# Patient Record
Sex: MALE | Race: WHITE | Employment: FULL TIME | URBAN - METROPOLITAN AREA
[De-identification: names, ages, dates, MRNs, and addresses within clinical notes are randomized per-mention and may not be internally consistent; named-entity substitution may affect disease eponyms.]

---

## 2018-03-27 PROBLEM — R97.20 ELEVATED PSA: Status: ACTIVE | Noted: 2018-03-27

## 2020-02-10 ENCOUNTER — HOSPITAL ENCOUNTER (OUTPATIENT)
Dept: LAB | Age: 60
Discharge: HOME | End: 2020-02-10
Payer: COMMERCIAL

## 2020-02-10 DIAGNOSIS — F17.200 SMOKER: ICD-10-CM

## 2020-02-10 DIAGNOSIS — C43.4 MALIGNANT MELANOMA OF NECK (HCC): ICD-10-CM

## 2020-02-10 LAB
ALBUMIN SERPL-MCNC: 3.8 G/DL (ref 3.2–4.6)
ALBUMIN/GLOB SERPL: 1 {RATIO} (ref 1.2–3.5)
ALP SERPL-CCNC: 158 U/L (ref 50–136)
ALT SERPL-CCNC: 28 U/L (ref 12–65)
ANION GAP SERPL CALC-SCNC: 7 MMOL/L (ref 7–16)
AST SERPL-CCNC: 14 U/L (ref 15–37)
BILIRUB SERPL-MCNC: 0.6 MG/DL (ref 0.2–1.1)
BUN SERPL-MCNC: 13 MG/DL (ref 8–23)
CALCIUM SERPL-MCNC: 8.9 MG/DL (ref 8.3–10.4)
CHLORIDE SERPL-SCNC: 99 MMOL/L (ref 98–107)
CO2 SERPL-SCNC: 30 MMOL/L (ref 21–32)
CREAT SERPL-MCNC: 1.08 MG/DL (ref 0.8–1.5)
ERYTHROCYTE [DISTWIDTH] IN BLOOD BY AUTOMATED COUNT: 11.7 % (ref 11.9–14.6)
GLOBULIN SER CALC-MCNC: 3.7 G/DL (ref 2.3–3.5)
GLUCOSE SERPL-MCNC: 509 MG/DL (ref 65–100)
HCT VFR BLD AUTO: 48.7 % (ref 41.1–50.3)
HGB BLD-MCNC: 16.7 G/DL (ref 13.6–17.2)
MCH RBC QN AUTO: 32.6 PG (ref 26.1–32.9)
MCHC RBC AUTO-ENTMCNC: 34.3 G/DL (ref 31.4–35)
MCV RBC AUTO: 94.9 FL (ref 79.6–97.8)
NRBC # BLD: 0 K/UL (ref 0–0.2)
PLATELET # BLD AUTO: 143 K/UL (ref 150–450)
PMV BLD AUTO: 10.8 FL (ref 9.4–12.3)
POTASSIUM SERPL-SCNC: 4.3 MMOL/L (ref 3.5–5.1)
PROT SERPL-MCNC: 7.5 G/DL (ref 6.3–8.2)
RBC # BLD AUTO: 5.13 M/UL (ref 4.23–5.6)
SODIUM SERPL-SCNC: 136 MMOL/L (ref 136–145)
WBC # BLD AUTO: 3.2 K/UL (ref 4.3–11.1)

## 2020-02-10 PROCEDURE — 80053 COMPREHEN METABOLIC PANEL: CPT

## 2020-02-10 PROCEDURE — 85027 COMPLETE CBC AUTOMATED: CPT

## 2020-02-10 PROCEDURE — 36415 COLL VENOUS BLD VENIPUNCTURE: CPT

## 2020-02-10 NOTE — PROGRESS NOTES
I called pt on 2/10/20 about his BG>500 and   urged him to take his diabetic meds and get in to see his primary care doctor asap!

## 2020-03-16 PROBLEM — E11.8 CONTROLLED TYPE 2 DIABETES MELLITUS WITH COMPLICATION, WITHOUT LONG-TERM CURRENT USE OF INSULIN (HCC): Status: ACTIVE | Noted: 2020-03-16

## 2020-03-16 PROBLEM — F10.10 EXCESSIVE DRINKING ALCOHOL: Status: ACTIVE | Noted: 2020-03-16

## 2020-03-16 PROBLEM — Z87.898 HISTORY OF ELEVATED PSA: Status: ACTIVE | Noted: 2020-03-16

## 2020-03-18 RX ORDER — SODIUM CHLORIDE 0.9 % (FLUSH) 0.9 %
5-40 SYRINGE (ML) INJECTION EVERY 8 HOURS
Status: CANCELLED | OUTPATIENT
Start: 2020-03-18

## 2020-03-18 RX ORDER — SODIUM CHLORIDE 0.9 % (FLUSH) 0.9 %
5-40 SYRINGE (ML) INJECTION AS NEEDED
Status: CANCELLED | OUTPATIENT
Start: 2020-03-18

## 2020-03-24 ENCOUNTER — HOSPITAL ENCOUNTER (OUTPATIENT)
Dept: SURGERY | Age: 60
Discharge: HOME | End: 2020-03-24
Payer: COMMERCIAL

## 2020-03-24 VITALS
SYSTOLIC BLOOD PRESSURE: 153 MMHG | TEMPERATURE: 98.8 F | OXYGEN SATURATION: 97 % | HEIGHT: 74 IN | RESPIRATION RATE: 18 BRPM | HEART RATE: 60 BPM | WEIGHT: 215.19 LBS | DIASTOLIC BLOOD PRESSURE: 77 MMHG | BODY MASS INDEX: 27.62 KG/M2

## 2020-03-24 LAB — GLUCOSE BLD STRIP.AUTO-MCNC: 320 MG/DL (ref 65–100)

## 2020-03-24 PROCEDURE — 82962 GLUCOSE BLOOD TEST: CPT

## 2020-03-24 NOTE — PERIOP NOTES
Patient verified name and     Order for consent was found in EHR and matches case posting; patient verified. Type 1B surgery, walk-in assessment complete. Labs per surgeon: none  Labs per anesthesia protocol: poc glucose  EKG: none required    Hospital approved surgical skin cleanser and instructions given per hospital policy. Patient provided with and instructed on educational handouts including Guide to Surgery, Pain Management, Hand Hygiene, Blood Transfusion Education, and [facility] Brochure. Patient answered medical/surgical history questions at their best of ability. All prior to admission medications documented in [facility]. Original medication prescription bottle not visualized during patient appointment. Patient instructed to hold all vitamins 7 days prior to surgery and NSAIDS 5 days prior to surgery, patient verbalized understanding. Patient teach back successful and patient demonstrates knowledge of instructions.

## 2020-03-24 NOTE — PERIOP NOTES
PLEASE CONTINUE TAKING ALL PRESCRIPTION MEDICATIONS UP TO THE DAY OF SURGERY UNLESS OTHERWISE DIRECTED BELOW. DISCONTINUE all vitamins and supplements 7 days prior to surgery. DISCONTINUE Non-Steriodal Anti-Inflammatory (NSAIDS) such as Advil and Aleve 5 days prior to surgery. Home Medications to take  the day of surgery    Acetaminophen 1000 mg           Home Medications   to Hold           Comments    On the day before surgery take Acetaminophen 1000mg in the morning and at bedtime       *Visitor policy of 1 visitor per patient discussed. Please do not bring home medications with you on the day of surgery unless otherwise directed by your nurse. If you are instructed to bring home medications, please give them to your nurse as they will be administered by the nursing staff. If you have any questions, please call [facility] [phone] or [facility] [phone]. A copy of this note was provided to the patient for reference.

## 2020-03-24 NOTE — PERIOP NOTES
POC glucose 320. Instructed  Patient that if blood sugar 300 or > , surgery may be cancelled. Dr. Ramiro James notified. Patient informed he needed to have blood sugar under control before surgery. Patient saw PCP last week for blood sugar greater than 500. He was to have lab work drawn and then follow up. I called Dr. Ryan Tijerina office. Patient will go in the am and have lab work drawn. Nurse will pull labs and have doctor review and start patient on medication.

## 2020-03-25 NOTE — PERIOP NOTES
Patient started on metformin and Farxiga by PCP. Updated patient by phone and  instructed to hold those medications am of surgery.

## 2020-03-30 ENCOUNTER — ANESTHESIA EVENT (OUTPATIENT)
Dept: SURGERY | Age: 60
End: 2020-03-30
Payer: COMMERCIAL

## 2020-03-31 ENCOUNTER — APPOINTMENT (OUTPATIENT)
Dept: NUCLEAR MEDICINE | Age: 60
End: 2020-03-31
Attending: SURGERY
Payer: COMMERCIAL

## 2020-03-31 ENCOUNTER — ANESTHESIA (OUTPATIENT)
Dept: SURGERY | Age: 60
End: 2020-03-31
Payer: COMMERCIAL

## 2020-03-31 ENCOUNTER — HOSPITAL ENCOUNTER (OUTPATIENT)
Age: 60
Setting detail: OUTPATIENT SURGERY
Discharge: HOME | End: 2020-03-31
Attending: SURGERY | Admitting: SURGERY
Payer: COMMERCIAL

## 2020-03-31 VITALS
HEART RATE: 53 BPM | SYSTOLIC BLOOD PRESSURE: 115 MMHG | WEIGHT: 209 LBS | TEMPERATURE: 98 F | RESPIRATION RATE: 14 BRPM | DIASTOLIC BLOOD PRESSURE: 59 MMHG | OXYGEN SATURATION: 95 % | BODY MASS INDEX: 26.83 KG/M2

## 2020-03-31 DIAGNOSIS — C43.4 MALIGNANT MELANOMA OF NECK (HCC): ICD-10-CM

## 2020-03-31 LAB — GLUCOSE BLD STRIP.AUTO-MCNC: 167 MG/DL (ref 65–100)

## 2020-03-31 PROCEDURE — 74011250636 HC RX REV CODE- 250/636: Performed by: NURSE ANESTHETIST, CERTIFIED REGISTERED

## 2020-03-31 PROCEDURE — 77030037088 HC TUBE ENDOTRACH ORAL NSL COVD-A: Performed by: NURSE ANESTHETIST, CERTIFIED REGISTERED

## 2020-03-31 PROCEDURE — 74011000250 HC RX REV CODE- 250: Performed by: SURGERY

## 2020-03-31 PROCEDURE — 88307 TISSUE EXAM BY PATHOLOGIST: CPT

## 2020-03-31 PROCEDURE — 77030003029 HC SUT VCRL J&J -B: Performed by: SURGERY

## 2020-03-31 PROCEDURE — 74011250637 HC RX REV CODE- 250/637: Performed by: ANESTHESIOLOGY

## 2020-03-31 PROCEDURE — 77030002916 HC SUT ETHLN J&J -A: Performed by: SURGERY

## 2020-03-31 PROCEDURE — 76010000161 HC OR TIME 1 TO 1.5 HR INTENSV-TIER 1: Performed by: SURGERY

## 2020-03-31 PROCEDURE — 88305 TISSUE EXAM BY PATHOLOGIST: CPT

## 2020-03-31 PROCEDURE — 77030010512 HC APPL CLP LIG J&J -C: Performed by: SURGERY

## 2020-03-31 PROCEDURE — 74011000250 HC RX REV CODE- 250: Performed by: NURSE ANESTHETIST, CERTIFIED REGISTERED

## 2020-03-31 PROCEDURE — 77030018836 HC SOL IRR NACL ICUM -A: Performed by: SURGERY

## 2020-03-31 PROCEDURE — 74011250636 HC RX REV CODE- 250/636: Performed by: ANESTHESIOLOGY

## 2020-03-31 PROCEDURE — 76210000006 HC OR PH I REC 0.5 TO 1 HR: Performed by: SURGERY

## 2020-03-31 PROCEDURE — 76210000020 HC REC RM PH II FIRST 0.5 HR: Performed by: SURGERY

## 2020-03-31 PROCEDURE — 77030040361 HC SLV COMPR DVT MDII -B: Performed by: SURGERY

## 2020-03-31 PROCEDURE — 77030039425 HC BLD LARYNG TRULITE DISP TELE -A: Performed by: NURSE ANESTHETIST, CERTIFIED REGISTERED

## 2020-03-31 PROCEDURE — 77030008462 HC STPLR SKN PROX J&J -A: Performed by: SURGERY

## 2020-03-31 PROCEDURE — A9541 TC99M SULFUR COLLOID: HCPCS

## 2020-03-31 PROCEDURE — 76060000034 HC ANESTHESIA 1.5 TO 2 HR: Performed by: SURGERY

## 2020-03-31 PROCEDURE — 77030040922 HC BLNKT HYPOTHRM STRY -A: Performed by: NURSE ANESTHETIST, CERTIFIED REGISTERED

## 2020-03-31 PROCEDURE — 77030019908 HC STETH ESOPH SIMS -A: Performed by: NURSE ANESTHETIST, CERTIFIED REGISTERED

## 2020-03-31 PROCEDURE — 82962 GLUCOSE BLOOD TEST: CPT

## 2020-03-31 PROCEDURE — 74011250636 HC RX REV CODE- 250/636: Performed by: SURGERY

## 2020-03-31 RX ORDER — MIDAZOLAM HYDROCHLORIDE 1 MG/ML
2 INJECTION, SOLUTION INTRAMUSCULAR; INTRAVENOUS
Status: DISCONTINUED | OUTPATIENT
Start: 2020-03-31 | End: 2020-03-31 | Stop reason: HOSPADM

## 2020-03-31 RX ORDER — HYDROMORPHONE HYDROCHLORIDE 2 MG/ML
0.5 INJECTION, SOLUTION INTRAMUSCULAR; INTRAVENOUS; SUBCUTANEOUS
Status: DISCONTINUED | OUTPATIENT
Start: 2020-03-31 | End: 2020-03-31 | Stop reason: HOSPADM

## 2020-03-31 RX ORDER — SODIUM CHLORIDE 0.9 % (FLUSH) 0.9 %
5-40 SYRINGE (ML) INJECTION EVERY 8 HOURS
Status: DISCONTINUED | OUTPATIENT
Start: 2020-03-31 | End: 2020-03-31 | Stop reason: HOSPADM

## 2020-03-31 RX ORDER — LIDOCAINE HYDROCHLORIDE 10 MG/ML
0.1 INJECTION INFILTRATION; PERINEURAL AS NEEDED
Status: DISCONTINUED | OUTPATIENT
Start: 2020-03-31 | End: 2020-03-31 | Stop reason: HOSPADM

## 2020-03-31 RX ORDER — ONDANSETRON 2 MG/ML
INJECTION INTRAMUSCULAR; INTRAVENOUS AS NEEDED
Status: DISCONTINUED | OUTPATIENT
Start: 2020-03-31 | End: 2020-03-31 | Stop reason: HOSPADM

## 2020-03-31 RX ORDER — NEOSTIGMINE METHYLSULFATE 1 MG/ML
INJECTION, SOLUTION INTRAVENOUS AS NEEDED
Status: DISCONTINUED | OUTPATIENT
Start: 2020-03-31 | End: 2020-03-31 | Stop reason: HOSPADM

## 2020-03-31 RX ORDER — ROCURONIUM BROMIDE 10 MG/ML
INJECTION, SOLUTION INTRAVENOUS AS NEEDED
Status: DISCONTINUED | OUTPATIENT
Start: 2020-03-31 | End: 2020-03-31 | Stop reason: HOSPADM

## 2020-03-31 RX ORDER — BUPIVACAINE HYDROCHLORIDE 2.5 MG/ML
INJECTION, SOLUTION EPIDURAL; INFILTRATION; INTRACAUDAL AS NEEDED
Status: DISCONTINUED | OUTPATIENT
Start: 2020-03-31 | End: 2020-03-31 | Stop reason: HOSPADM

## 2020-03-31 RX ORDER — SUCCINYLCHOLINE CHLORIDE 20 MG/ML
INJECTION INTRAMUSCULAR; INTRAVENOUS AS NEEDED
Status: DISCONTINUED | OUTPATIENT
Start: 2020-03-31 | End: 2020-03-31 | Stop reason: HOSPADM

## 2020-03-31 RX ORDER — EPHEDRINE SULFATE/0.9% NACL/PF 50 MG/5 ML
SYRINGE (ML) INTRAVENOUS AS NEEDED
Status: DISCONTINUED | OUTPATIENT
Start: 2020-03-31 | End: 2020-03-31 | Stop reason: HOSPADM

## 2020-03-31 RX ORDER — NALOXONE HYDROCHLORIDE 0.4 MG/ML
0.1 INJECTION, SOLUTION INTRAMUSCULAR; INTRAVENOUS; SUBCUTANEOUS AS NEEDED
Status: DISCONTINUED | OUTPATIENT
Start: 2020-03-31 | End: 2020-03-31 | Stop reason: HOSPADM

## 2020-03-31 RX ORDER — FENTANYL CITRATE 50 UG/ML
INJECTION, SOLUTION INTRAMUSCULAR; INTRAVENOUS AS NEEDED
Status: DISCONTINUED | OUTPATIENT
Start: 2020-03-31 | End: 2020-03-31 | Stop reason: HOSPADM

## 2020-03-31 RX ORDER — PROPOFOL 10 MG/ML
INJECTION, EMULSION INTRAVENOUS AS NEEDED
Status: DISCONTINUED | OUTPATIENT
Start: 2020-03-31 | End: 2020-03-31 | Stop reason: HOSPADM

## 2020-03-31 RX ORDER — HYDROCODONE BITARTRATE AND ACETAMINOPHEN 7.5; 325 MG/1; MG/1
1 TABLET ORAL AS NEEDED
Status: DISCONTINUED | OUTPATIENT
Start: 2020-03-31 | End: 2020-03-31 | Stop reason: HOSPADM

## 2020-03-31 RX ORDER — OXYCODONE HYDROCHLORIDE 5 MG/1
5 TABLET ORAL
Status: DISCONTINUED | OUTPATIENT
Start: 2020-03-31 | End: 2020-03-31 | Stop reason: HOSPADM

## 2020-03-31 RX ORDER — FENTANYL CITRATE 50 UG/ML
100 INJECTION, SOLUTION INTRAMUSCULAR; INTRAVENOUS ONCE
Status: DISCONTINUED | OUTPATIENT
Start: 2020-03-31 | End: 2020-03-31 | Stop reason: HOSPADM

## 2020-03-31 RX ORDER — CEFAZOLIN SODIUM/WATER 2 G/20 ML
2 SYRINGE (ML) INTRAVENOUS ONCE
Status: COMPLETED | OUTPATIENT
Start: 2020-03-31 | End: 2020-03-31

## 2020-03-31 RX ORDER — SODIUM CHLORIDE 9 MG/ML
25 INJECTION, SOLUTION INTRAVENOUS CONTINUOUS
Status: DISCONTINUED | OUTPATIENT
Start: 2020-03-31 | End: 2020-03-31 | Stop reason: HOSPADM

## 2020-03-31 RX ORDER — SODIUM CHLORIDE 0.9 % (FLUSH) 0.9 %
5-40 SYRINGE (ML) INJECTION AS NEEDED
Status: DISCONTINUED | OUTPATIENT
Start: 2020-03-31 | End: 2020-03-31 | Stop reason: HOSPADM

## 2020-03-31 RX ORDER — SODIUM CHLORIDE, SODIUM LACTATE, POTASSIUM CHLORIDE, CALCIUM CHLORIDE 600; 310; 30; 20 MG/100ML; MG/100ML; MG/100ML; MG/100ML
100 INJECTION, SOLUTION INTRAVENOUS CONTINUOUS
Status: DISCONTINUED | OUTPATIENT
Start: 2020-03-31 | End: 2020-03-31 | Stop reason: HOSPADM

## 2020-03-31 RX ORDER — GLYCOPYRROLATE 0.2 MG/ML
INJECTION INTRAMUSCULAR; INTRAVENOUS AS NEEDED
Status: DISCONTINUED | OUTPATIENT
Start: 2020-03-31 | End: 2020-03-31 | Stop reason: HOSPADM

## 2020-03-31 RX ORDER — FAMOTIDINE 20 MG/1
20 TABLET, FILM COATED ORAL ONCE
Status: COMPLETED | OUTPATIENT
Start: 2020-03-31 | End: 2020-03-31

## 2020-03-31 RX ORDER — DEXAMETHASONE SODIUM PHOSPHATE 4 MG/ML
INJECTION, SOLUTION INTRA-ARTICULAR; INTRALESIONAL; INTRAMUSCULAR; INTRAVENOUS; SOFT TISSUE AS NEEDED
Status: DISCONTINUED | OUTPATIENT
Start: 2020-03-31 | End: 2020-03-31 | Stop reason: HOSPADM

## 2020-03-31 RX ORDER — LIDOCAINE HYDROCHLORIDE 20 MG/ML
INJECTION, SOLUTION EPIDURAL; INFILTRATION; INTRACAUDAL; PERINEURAL AS NEEDED
Status: DISCONTINUED | OUTPATIENT
Start: 2020-03-31 | End: 2020-03-31 | Stop reason: HOSPADM

## 2020-03-31 RX ORDER — SODIUM CHLORIDE, SODIUM LACTATE, POTASSIUM CHLORIDE, CALCIUM CHLORIDE 600; 310; 30; 20 MG/100ML; MG/100ML; MG/100ML; MG/100ML
INJECTION, SOLUTION INTRAVENOUS
Status: DISCONTINUED | OUTPATIENT
Start: 2020-03-31 | End: 2020-03-31 | Stop reason: HOSPADM

## 2020-03-31 RX ADMIN — Medication 5 MG: at 10:42

## 2020-03-31 RX ADMIN — FAMOTIDINE 20 MG: 20 TABLET ORAL at 07:32

## 2020-03-31 RX ADMIN — PROPOFOL 200 MG: 10 INJECTION, EMULSION INTRAVENOUS at 10:25

## 2020-03-31 RX ADMIN — GLYCOPYRROLATE 0.2 MG: 0.2 INJECTION, SOLUTION INTRAMUSCULAR; INTRAVENOUS at 11:27

## 2020-03-31 RX ADMIN — PROPOFOL 100 MG: 10 INJECTION, EMULSION INTRAVENOUS at 10:46

## 2020-03-31 RX ADMIN — Medication 1.5 MG: at 11:25

## 2020-03-31 RX ADMIN — FENTANYL CITRATE 25 MCG: 50 INJECTION INTRAMUSCULAR; INTRAVENOUS at 10:18

## 2020-03-31 RX ADMIN — FENTANYL CITRATE 50 MCG: 50 INJECTION INTRAMUSCULAR; INTRAVENOUS at 10:34

## 2020-03-31 RX ADMIN — LIDOCAINE HYDROCHLORIDE 100 MG: 20 INJECTION, SOLUTION EPIDURAL; INFILTRATION; INTRACAUDAL; PERINEURAL at 10:25

## 2020-03-31 RX ADMIN — SODIUM CHLORIDE, SODIUM LACTATE, POTASSIUM CHLORIDE, AND CALCIUM CHLORIDE 100 ML/HR: 600; 310; 30; 20 INJECTION, SOLUTION INTRAVENOUS at 07:32

## 2020-03-31 RX ADMIN — Medication 0.5 G: at 10:20

## 2020-03-31 RX ADMIN — FENTANYL CITRATE 50 MCG: 50 INJECTION INTRAMUSCULAR; INTRAVENOUS at 10:46

## 2020-03-31 RX ADMIN — DEXAMETHASONE SODIUM PHOSPHATE 10 MG: 4 INJECTION, SOLUTION INTRAMUSCULAR; INTRAVENOUS at 10:30

## 2020-03-31 RX ADMIN — SUCCINYLCHOLINE CHLORIDE 160 MG: 20 INJECTION, SOLUTION INTRAMUSCULAR; INTRAVENOUS at 10:26

## 2020-03-31 RX ADMIN — ROCURONIUM BROMIDE 30 MG: 10 INJECTION, SOLUTION INTRAVENOUS at 10:52

## 2020-03-31 RX ADMIN — FENTANYL CITRATE 50 MCG: 50 INJECTION INTRAMUSCULAR; INTRAVENOUS at 10:52

## 2020-03-31 RX ADMIN — ROCURONIUM BROMIDE 10 MG: 10 INJECTION, SOLUTION INTRAVENOUS at 11:11

## 2020-03-31 RX ADMIN — ONDANSETRON 4 MG: 2 INJECTION INTRAMUSCULAR; INTRAVENOUS at 10:38

## 2020-03-31 RX ADMIN — GLYCOPYRROLATE 0.2 MG: 0.2 INJECTION, SOLUTION INTRAMUSCULAR; INTRAVENOUS at 11:28

## 2020-03-31 RX ADMIN — Medication 1.5 MG: at 11:27

## 2020-03-31 RX ADMIN — GLYCOPYRROLATE 0.2 MG: 0.2 INJECTION, SOLUTION INTRAMUSCULAR; INTRAVENOUS at 11:25

## 2020-03-31 RX ADMIN — Medication 0.5 G: at 10:24

## 2020-03-31 RX ADMIN — GLYCOPYRROLATE 0.2 MG: 0.2 INJECTION, SOLUTION INTRAMUSCULAR; INTRAVENOUS at 11:29

## 2020-03-31 RX ADMIN — FENTANYL CITRATE 25 MCG: 50 INJECTION INTRAMUSCULAR; INTRAVENOUS at 11:31

## 2020-03-31 RX ADMIN — ROCURONIUM BROMIDE 10 MG: 10 INJECTION, SOLUTION INTRAVENOUS at 10:25

## 2020-03-31 RX ADMIN — FENTANYL CITRATE 25 MCG: 50 INJECTION INTRAMUSCULAR; INTRAVENOUS at 10:22

## 2020-03-31 RX ADMIN — Medication 1 MG: at 11:28

## 2020-03-31 RX ADMIN — FENTANYL CITRATE 50 MCG: 50 INJECTION INTRAMUSCULAR; INTRAVENOUS at 10:25

## 2020-03-31 RX ADMIN — Medication 1 MG: at 11:29

## 2020-03-31 RX ADMIN — PHENYLEPHRINE HYDROCHLORIDE 60 MCG: 10 INJECTION INTRAVENOUS at 10:52

## 2020-03-31 RX ADMIN — SODIUM CHLORIDE, SODIUM LACTATE, POTASSIUM CHLORIDE, AND CALCIUM CHLORIDE: 600; 310; 30; 20 INJECTION, SOLUTION INTRAVENOUS at 08:15

## 2020-03-31 RX ADMIN — PROPOFOL 40 MG: 10 INJECTION, EMULSION INTRAVENOUS at 10:35

## 2020-03-31 RX ADMIN — FENTANYL CITRATE 25 MCG: 50 INJECTION INTRAMUSCULAR; INTRAVENOUS at 11:29

## 2020-03-31 RX ADMIN — PHENYLEPHRINE HYDROCHLORIDE 120 MCG: 10 INJECTION INTRAVENOUS at 10:42

## 2020-03-31 NOTE — ANESTHESIA PREPROCEDURE EVALUATION
Relevant Problems   No relevant active problems       Anesthetic History               Review of Systems / Medical History  Patient summary reviewed and pertinent labs reviewed    Pulmonary          Smoker         Neuro/Psych              Cardiovascular                  Exercise tolerance: <4 METS     GI/Hepatic/Renal                Endo/Other    Diabetes: type 2    Cancer (Melanoma)     Other Findings   Comments: Heavy alcohol use         Physical Exam    Airway  Mallampati: II  TM Distance: > 6 cm  Neck ROM: normal range of motion   Mouth opening: Normal     Cardiovascular  Regular rate and rhythm,  S1 and S2 normal,  no murmur, click, rub, or gallop             Dental  No notable dental hx       Pulmonary  Breath sounds clear to auscultation               Abdominal         Other Findings            Anesthetic Plan    ASA: 2  Anesthesia type: general            Anesthetic plan and risks discussed with: Patient

## 2020-03-31 NOTE — ANESTHESIA POSTPROCEDURE EVALUATION
Procedure(s):  EXCISION OF MELONOMA OF NECK  SENTINEL NODE BIOPSIES OF NECK @ 7:30.    general    Anesthesia Post Evaluation      Multimodal analgesia: multimodal analgesia used between 6 hours prior to anesthesia start to PACU discharge  Patient location during evaluation: PACU  Patient participation: complete - patient participated  Level of consciousness: awake and alert  Pain management: adequate  Airway patency: patent  Anesthetic complications: no  Cardiovascular status: acceptable  Respiratory status: acceptable  Hydration status: acceptable  Post anesthesia nausea and vomiting:  none      Vitals Value Taken Time   /74 3/31/2020 12:15 PM   Temp 36.1 °C (97 °F) 3/31/2020 11:49 AM   Pulse 62 3/31/2020 12:15 PM   Resp 14 3/31/2020 12:10 PM   SpO2 93 % 3/31/2020 12:15 PM

## 2020-03-31 NOTE — H&P
H&P/Consult Note/Progress Note/Office Note:   Lucas Prabhakar MRN: [id number]  QNZ:[birth year]  Age:60 y.o.    HPI: Lucas Prabhakar is a 63 y.o. male who is here for WLE neck anterior melanoma with sent node excision on 3/31/20 for a cT2aN0 left neck melanoma. Prior to surgery he had a recent shave biopsy on 1/21/20 by Dr. Chris Brunson (shown below) of his left inferior anterior neck which identified a   pT2aNx Malignant Melanoma with a Breslow depth of at least 1.3 mm, Harvey's IV, -U, -LVI, approx 5 mitosis/sq mm, +regression, -microsatellitosis. He initially noted an atypically pigmented lesion involving his anterior lower neck near the sternal notch which he reports been present for many years and had been growing over the last few years. He describes previously trying to squeeze and drain some pigmented material out of the lesion. Nothing made it better or worse. No associated infections or skin drainage. No prior history of melanoma. He described several subcutaneous cystic lesions involving his back, left nasolabial fold, right anterior chest, and left wrist.  He denied systemic signs or symptoms of metastatic disease including headaches, seizures, hemoptysis, frequent respiratory infections, weight loss, pathologic fractures, etc.. He does have a long history of tobacco use. He is using nicotine patches at this time in an effort to quit smoking. He describes a recent lung infection earlier this winter. He attributes this to crawling under house. 2/10/20 I called pt about his BG>500 and urged him to take his diabetic meds and get in to see his primary care doctor asap!   2/10/20 Pre-op LFTs: normal except alk phosp 158    3/9/20 CT chest            Past Medical History:   Diagnosis Date    Cancer ([address]     melanoma    Diabetes ([address]     ot currently on medications    Skin abnormalities      Past Surgical History:   Procedure Laterality Date    HX OTHER SURGICAL      abcess rectal area     Current Facility-Administered Medications   Medication Dose Route Frequency    lidocaine (XYLOCAINE) 10 mg/mL (1 %) injection 0.1 mL  0.1 mL SubCUTAneous PRN    lactated Ringers infusion  100 mL/hr IntraVENous CONTINUOUS    0.9% sodium chloride infusion  25 mL/hr IntraVENous CONTINUOUS    sodium chloride (NS) flush 5-40 mL  5-40 mL IntraVENous Q8H    sodium chloride (NS) flush 5-40 mL  5-40 mL IntraVENous PRN    famotidine (PEPCID) tablet 20 mg  20 mg Oral ONCE    fentaNYL citrate (PF) injection 100 mcg  100 mcg IntraVENous ONCE    midazolam (VERSED) injection 2 mg  2 mg IntraVENous ONCE PRN    ceFAZolin (ANCEF) 2 g/20 mL in sterile water IV syringe  2 g IntraVENous ONCE    sodium chloride (NS) flush 5-40 mL  5-40 mL IntraVENous Q8H    sodium chloride (NS) flush 5-40 mL  5-40 mL IntraVENous PRN     Patient has no known allergies. Social History     Socioeconomic History    Marital status: SINGLE     Spouse name: Not on file    Number of children: Not on file    Years of education: Not on file    Highest education level: Not on file   Tobacco Use    Smoking status: Current Every Day Smoker     Years: 46.00    Smokeless tobacco: Never Used    Tobacco comment: trying to quit with patches   Substance and Sexual Activity    Alcohol use: Yes     Alcohol/week: 12.0 standard drinks     Types: 12 Cans of beer per week     Comment: case beer week    Drug use: No     Social History     Tobacco Use   Smoking Status Current Every Day Smoker    Years: 46.00   Smokeless Tobacco Never Used   Tobacco Comment    trying to quit with patches     Family History   Problem Relation Age of Onset    Cancer Maternal Aunt     Cancer Maternal Uncle     Heart Disease Father      ROS: The patient has no difficulty with chest pain or shortness of breath. No fever or chills. Comprehensive review of systems was otherwise unremarkable except as noted above.     Physical Exam: Visit Vitals  /69 (BP 1 Location: Right arm, BP Patient Position: At rest)   Pulse (!) 56   Temp 97.4 °F (36.3 °C)   Resp 16   Wt 209 lb (94.8 kg)   SpO2 97%   BMI 26.83 kg/m²     Vitals:    03/31/20 0647 03/31/20 0718   BP:  129/69   Pulse:  (!) 56   Resp:  16   Temp:  97.4 °F (36.3 °C)   SpO2:  97%   Weight: 209 lb (94.8 kg)      [unfilled]  [unfilled]    Constitutional: Alert, oriented, cooperative patient in no acute distress; appears stated age    Eyes:Sclera are clear. EOMs intact  ENMT: no external lesions gross hearing normal; no obvious neck masses, no ear or lip lesions, nares normal    Healing biopsy site of the inferior anterior neck. He points to location at sternal notch where there is a small healed scar  No satellite nodules. No regional adenopathy. He has small subcutaneous lesions suspicious for sebaceous cyst involving his back, right anterior chest, left nasolabial fold. He has a cystic lesion involving his volar left wrist consistent with a ganglion cyst      CV: RRR. Normal perfusion  Resp: No JVD. Breathing is  non-labored; no audible wheezing. GI: soft and non-distended     Musculoskeletal: unremarkable with normal function. No embolic signs or cyanosis. Neuro:  Oriented; moves all 4; no focal deficits  Psychiatric: normal affect and mood, no memory impairment    Recent vitals (if inpt):  @IPVITALS(24:)@    Labs:  No results for input(s): WBC, HGB, PLT, NA, K, CL, CO2, BUN, CREA, GLU, PTP, INR, APTT, TBIL, TBILI, CBIL, SGOT, GPT, ALT, AP, AML, AML, LPSE, LCAD, NH4, TROPT, TROIQ, PCO2, PO2, HCO3, HGBEXT, PLTEXT, INREXT, HGBEXT, PLTEXT, INREXT in the last 72 hours.     No lab exists for component:  PH    Lab Results   Component Value Date/Time    WBC 3.5 03/25/2020 09:48 AM    HGB 16.6 03/25/2020 09:48 AM    PLATELET 547 (L) 93/44/9746 09:48 AM    Sodium 136 02/10/2020 09:04 AM    Potassium 4.3 02/10/2020 09:04 AM    Chloride 99 02/10/2020 09:04 AM    CO2 30 02/10/2020 09:04 AM    BUN 13 02/10/2020 09:04 AM    Creatinine 1.08 02/10/2020 09:04 AM    Glucose 509 (HH) 02/10/2020 09:04 AM    Bilirubin, total 0.9 03/25/2020 09:48 AM    Bilirubin, direct 0.24 03/25/2020 09:48 AM    AST (SGOT) 12 03/25/2020 09:48 AM    ALT (SGPT) 18 03/25/2020 09:48 AM    Alk. phosphatase 110 03/25/2020 09:48 AM       CT Results  (Last 48 hours)    None        chest X-ray      I reviewed recent labs, recent radiologic studies, and pertinent records including other doctor notes if needed. I independently reviewed radiology images for studies I described above or studies I have ordered. Admission date (for inpatients): 3/31/2020   [unfilled]  [unfilled]    ASSESSMENT/PLAN:  Problem List  Date Reviewed: 3/16/2020          Codes Class Noted    Excessive drinking alcohol ICD-10-CM: F10.10  ICD-9-CM: 305.00  3/16/2020        Controlled type 2 diabetes mellitus with complication, without long-term current use of insulin ([facility] ICD-10-CM: E11.8  ICD-9-CM: 250.90  3/16/2020        History of elevated PSA ICD-10-CM: Z87.898  ICD-9-CM: V13.89  3/16/2020        Malignant melanoma of neck (HCC) ICD-10-CM: C43.4  ICD-9-CM: 172.4  2/10/2020        Elevated PSA ICD-10-CM: R97.20  ICD-9-CM: 790.93  3/27/2018            Active Problems:    * No active hospital problems. *       cT2aNx melanoma left neck   He is here for WLE neck anterior melanoma with sent node excision on 3/31/20 for a cT2aN0 left neck melanoma. We discussed treatment options. Informed consent was obtained for WLE of the invasive melanoma biopsy site with STSG from thigh and sentinel node mapping and excision. Procedural risks were discussed. All his questions were answered. He is in favor proceeding. RLL lung nodule in a smoker  2 x 4.7 mm accessory nodule by radiology. This will need follow-up with serial CT imaging.     Uncontrolled diabetes  I recommended strict diabetic diet, strict adherence to medical treatment plan, and close follow-up with his primary care physician. I counseled him that if he comes in on the day of surgery with a sign elevated blood glucose anesthesia would cancel his surgery per protocol. I have personally performed a face-to-face diagnostic evaluation and management  service on this patient. I have independently seen the patient. I have independently obtained the above history from the patient/family. I have independently examined the patient with above findings. I have independently reviewed data/labs for this patient and developed the above plan of care (MDM). Signed: Nidhi Barrow.  Sarina George MD, FACS

## 2020-03-31 NOTE — PERIOP NOTES
Dr. Hector Tapia aware pt is ready to go to [facility] med, he states he is unavailable to see pt at this time, he will see pt when he returns to preop.

## 2020-03-31 NOTE — DISCHARGE INSTRUCTIONS
Leave your incisional dressings alone until follow-up visit. Try to keep incisions as dry as possible to lower risk of infection. No driving until you are off pain meds for 24hrs and have no pain with movements associated with driving. Pain prescription (Norco) on chart for patient to use as needed for pain  Follow-up with Dr Shadi Obrien nurse Monique Maguire or Terrie Guillermo) in 1 week on a Tuesday in the office for a dressing change. [address], [address]  (Call for an appt time unless one is already made for you by discharge nurse which is preferred -->[phone]-->option 1)    YOUR DIABETES IS VERY POORLY CONTROLLED! YOUR Hgb AIC was 11.3 on 3/25/20. This is very bad! You need to stick to a strict diabetic diet and take your diabetic medications  You need aggressive follow-up with your primary care doctor for better diabetic control   or else your wound will not heal well and you will have a mch higher than normal risk of infection    ACTIVITY  · As tolerated and as directed by your doctor. · Bathe or shower as directed by your doctor. DIET  · Clear liquids until no nausea or vomiting; then light diet for the first day. · Advance to regular diet on second day, unless your doctor orders otherwise. · If nausea and vomiting continues, call your doctor. PAIN  · Take pain medication as directed by your doctor. · Call your doctor if pain is NOT relieved by medication. · DO NOT take aspirin of blood thinners unless directed by your doctor. CALL YOUR DOCTOR IF   · Excessive bleeding that does not stop after holding pressure over the area  · Temperature of 101 degrees F or above  · Excessive redness, swelling or bruising, and/ or green or yellow, smelly discharge from incision    AFTER ANESTHESIA   · For the first 24 hours: DO NOT Drive, Drink alcoholic beverages, or Make important decisions.    · Be aware of dizziness following anesthesia and while taking pain medication. After general anesthesia or intravenous sedation, for 24 hours or while taking prescription Narcotics:  · Limit your activities  · A responsible adult needs to be with you for the next 24 hours  · Do not drive and operate hazardous machinery  · Do not make important personal or business decisions  · Do not drink alcoholic beverages  · If you have not urinated within 8 hours after discharge, please contact your surgeon on call. · If you have sleep apnea and have a CPAP machine, please use it for all naps and sleeping. · Please use caution when taking narcotics and any of your home medications that may cause drowsiness. *  Please give a list of your current medications to your Primary Care Provider. *  Please update this list whenever your medications are discontinued, doses are      changed, or new medications (including over-the-counter products) are added. *  Please carry medication information at all times in case of emergency situations. These are general instructions for a healthy lifestyle:  No smoking/ No tobacco products/ Avoid exposure to second hand smoke  Surgeon General's Warning:  Quitting smoking now greatly reduces serious risk to your health. Obesity, smoking, and sedentary lifestyle greatly increases your risk for illness  A healthy diet, regular physical exercise & weight monitoring are important for maintaining a healthy lifestyle    You may be retaining fluid if you have a history of heart failure or if you experience any of the following symptoms:  Weight gain of 3 pounds or more overnight or 5 pounds in a week, increased swelling in our hands or feet or shortness of breath while lying flat in bed. Please call your doctor as soon as you notice any of these symptoms; do not wait until your next office visit.

## 2020-03-31 NOTE — PERIOP NOTES
Attempted to call radiology x2 without success. Matti calls and states he is ready for pt, will send for him now.

## 2020-03-31 NOTE — OP NOTES
[facility]  OPERATIVE REPORT    Name:  Denver Irwin  MR#:  [id number]  :  1960  ACCOUNT #:  [de-identified]  DATE OF SERVICE:  2020    PREOPERATIVE DIAGNOSIS:  Invasive melanoma of the anterior lower neck. POSTOPERATIVE DIAGNOSIS:  Invasive melanoma of the anterior lower neck. PROCEDURE PERFORMED:  1. Wide radical resection of soft tissue of the anterior neck for malignant melanoma greater than 5 cm (CPT 31268)  2. Deep cervical node biopsy of the left neck following intraoperative mapping and identification (CPT 10713-97, 97782+)  3. Open excision of the right deep jugular sentinel node at separate site through separate incision (CPT 09325-81)    SURGEON:  Portia Andre. Elizabeth Zepeda MD.    ASSISTANT:  None. ANESTHESIA:  General.    COMPLICATIONS:  None. SPECIMENS REMOVED:  Wide radical resection along with left deep cervical/supraclavicular node and right deep jugular node sent to pathology for review. IMPLANTS:  None. ESTIMATED BLOOD LOSS:  Less than 20 mL. OPERATIVE PROCEDURE:  The patient was taken to the operating room, placed in a supine position. After adequate anesthesia was given, his neck and left thigh were prepped and draped in a sterile fashion with multiple layers of Betadine scrub and Betadine solution. Time-out protocol was followed. He was given prophylactic antibiotics. He previously had a shave biopsy performed which identified malignant melanoma present in at least one of the margins. The true depth could not be determined with certainty. A 2-cm grossly negative margin was marked out around the melanoma with a slightly larger transverse dimension marked out to help with closure. This created a carmina shape to allow for transverse closure.   A full thickness excision of the skin and subcutaneous adipose tissue as well as soft tissue was performed to proceed with a wide radical resection of skin and soft tissue of the anterior lower neck at the melanoma biopsy site. We dissected all the way to the fascia and excised the specimens in continuity. A short suture was placed at the superior margin and a long suture at the lateral margin. It was sent to pathology for review. The resulting wound defect was over 5 cm in length in the transverse dimension. We irrigated the wound. We anesthetized it. We were able to close this without skin grafting. A portion of the incision was closed with interrupted deep dermal 3-0 Vicryl sutures and retention sutures of 2-0 nylon between skin clips. The left lateral portion was left open so that we could proceed with sentinel node excision. We used the Neoprobe to intraoperatively map and identify the sentinel nodes. The patient drained to both sides of his neck. On the left, he drained through a supraclavicular deep cervical node which was circumferentially dissected from the surrounding tissues using the Neoprobe for navigation and to aid in dissection. Lymphatic and venous tributaries were clipped. The node was completely removed. It was sent to pathology for review. Irrigation was used. Hemostasis was confirmed. Following excision of the deep left cervical/supraclavicular node, we turned our attention to the contralateral right neck. We repositioned the patient with his head turned to the left and used the Neoprobe on his right neck and identified a deep jugular node along the anterior border of the sternocleidomastoid. We made a completely separate incision there at a separate site and dissected into the deep right neck space next to the jugular digastric bundle. We used the Neoprobe to guide our dissection and identify the right jugular sentinel node. Lymphatic and venous tributaries were clipped. The node was removed intact. It had a count of 220 similar to the left side. Both nodes were sent to pathology for review and marked separately. The right neck incision was irrigated.   Hemostasis was confirmed. Local anesthetic was given. Interrupted deep dermal 3-0 Vicryl sutures were placed. The skin was closed with clips. We then closed the remainder of the left neck incision at the wide excision site using interrupted nylon sutures and skin clips. A sterile dressing was placed over all the sites consisting of 4x4s and multiple Tegaderms. The patient tolerated the procedure well. He was taken to recovery in good condition.       Elaine Vogt MD [id number]  D:  03/31/2020 11:45  T:  03/31/2020 18:04  JOB #:  [id number]

## 2020-04-15 PROBLEM — E78.49 OTHER HYPERLIPIDEMIA: Status: ACTIVE | Noted: 2020-04-15

## 2020-04-15 PROBLEM — E11.65 HYPERGLYCEMIA DUE TO TYPE 2 DIABETES MELLITUS (HCC): Status: ACTIVE | Noted: 2020-04-15

## 2020-04-15 PROBLEM — D69.6 THROMBOCYTOPENIA (HCC): Status: ACTIVE | Noted: 2020-04-15

## 2022-02-07 ENCOUNTER — HOSPITAL ENCOUNTER (OUTPATIENT)
Dept: LAB | Age: 62
Discharge: HOME | End: 2022-02-07
Payer: COMMERCIAL

## 2022-02-07 DIAGNOSIS — R97.20 ELEVATED PSA: ICD-10-CM

## 2022-02-07 LAB — PSA SERPL-MCNC: 6.4 NG/ML

## 2022-02-07 PROCEDURE — 36415 COLL VENOUS BLD VENIPUNCTURE: CPT

## 2022-02-07 PROCEDURE — 84153 ASSAY OF PSA TOTAL: CPT

## 2022-02-15 ENCOUNTER — HOSPITAL ENCOUNTER (OUTPATIENT)
Dept: MRI IMAGING | Age: 62
Discharge: HOME | End: 2022-02-15
Attending: UROLOGY
Payer: COMMERCIAL

## 2022-02-15 DIAGNOSIS — R97.20 ELEVATED PSA: ICD-10-CM

## 2022-02-15 PROCEDURE — 72197 MRI PELVIS W/O & W/DYE: CPT

## 2022-02-15 PROCEDURE — A9576 INJ PROHANCE MULTIPACK: HCPCS | Performed by: UROLOGY

## 2022-02-15 PROCEDURE — 74011250636 HC RX REV CODE- 250/636: Performed by: UROLOGY

## 2022-02-15 PROCEDURE — 74011000258 HC RX REV CODE- 258: Performed by: UROLOGY

## 2022-02-15 RX ORDER — SODIUM CHLORIDE 0.9 % (FLUSH) 0.9 %
10 SYRINGE (ML) INJECTION
Status: COMPLETED | OUTPATIENT
Start: 2022-02-15 | End: 2022-02-15

## 2022-02-15 RX ADMIN — Medication 10 ML: at 21:33

## 2022-02-15 RX ADMIN — SODIUM CHLORIDE 100 ML: 900 INJECTION, SOLUTION INTRAVENOUS at 21:32

## 2022-02-15 RX ADMIN — GADOTERIDOL 20 ML: 279.3 INJECTION, SOLUTION INTRAVENOUS at 21:32

## 2022-03-18 PROBLEM — Z87.898 HISTORY OF ELEVATED PSA: Status: ACTIVE | Noted: 2020-03-16

## 2022-03-18 PROBLEM — R97.20 ELEVATED PSA: Status: ACTIVE | Noted: 2018-03-27

## 2022-03-18 PROBLEM — F10.10 EXCESSIVE DRINKING ALCOHOL: Status: ACTIVE | Noted: 2020-03-16

## 2022-03-18 PROBLEM — D69.6 THROMBOCYTOPENIA (HCC): Status: ACTIVE | Noted: 2020-04-15

## 2022-03-19 PROBLEM — E11.65 HYPERGLYCEMIA DUE TO TYPE 2 DIABETES MELLITUS (HCC): Status: ACTIVE | Noted: 2020-04-15

## 2022-03-19 PROBLEM — C43.4 MALIGNANT MELANOMA OF NECK (HCC): Status: ACTIVE | Noted: 2020-02-10

## 2022-03-20 PROBLEM — E78.49 OTHER HYPERLIPIDEMIA: Status: ACTIVE | Noted: 2020-04-15

## 2022-06-01 ENCOUNTER — OFFICE VISIT (OUTPATIENT)
Dept: ENDOCRINOLOGY | Age: 62
End: 2022-06-01
Payer: COMMERCIAL

## 2022-06-01 VITALS
SYSTOLIC BLOOD PRESSURE: 136 MMHG | HEART RATE: 68 BPM | OXYGEN SATURATION: 100 % | DIASTOLIC BLOOD PRESSURE: 82 MMHG | WEIGHT: 212.6 LBS | BODY MASS INDEX: 27.28 KG/M2 | HEIGHT: 74 IN | TEMPERATURE: 97.2 F

## 2022-06-01 DIAGNOSIS — E11.65 TYPE 2 DIABETES MELLITUS WITH HYPERGLYCEMIA, WITHOUT LONG-TERM CURRENT USE OF INSULIN (HCC): Primary | ICD-10-CM

## 2022-06-01 PROCEDURE — 99205 OFFICE O/P NEW HI 60 MIN: CPT | Performed by: DIETITIAN, REGISTERED

## 2022-06-01 PROCEDURE — 3052F HG A1C>EQUAL 8.0%<EQUAL 9.0%: CPT | Performed by: DIETITIAN, REGISTERED

## 2022-06-01 RX ORDER — UBIDECARENONE 75 MG
50 CAPSULE ORAL DAILY
COMMUNITY

## 2022-06-01 RX ORDER — DAPAGLIFLOZIN 5 MG/1
5 TABLET, FILM COATED ORAL EVERY MORNING
COMMUNITY
End: 2022-08-03

## 2022-06-01 RX ORDER — BLOOD SUGAR DIAGNOSTIC
STRIP MISCELLANEOUS
Qty: 100 EACH | Refills: 3 | Status: SHIPPED | OUTPATIENT
Start: 2022-06-01

## 2022-06-01 RX ORDER — MELATONIN
1000 DAILY
COMMUNITY

## 2022-06-01 RX ORDER — MULTIVITAMIN WITH IRON
100 TABLET ORAL DAILY
COMMUNITY

## 2022-06-01 ASSESSMENT — ENCOUNTER SYMPTOMS
VOMITING: 0
NAUSEA: 0
SHORTNESS OF BREATH: 0
CONSTIPATION: 1
TROUBLE SWALLOWING: 0
ABDOMINAL PAIN: 0
DIARRHEA: 1
BACK PAIN: 0
WHEEZING: 0
VOICE CHANGE: 0
COUGH: 0

## 2022-06-01 NOTE — PROGRESS NOTES
[facility] ENDOCRINOLOGY   AND   THYROID NODULE CLINIC    LEMUEL Sullivan   [address], [address] [facility], [address] [facility]  Phone [phone]  Facsimile [phone]        Reason for visit:     Gopi BROCK (1960) is here for new evaluation and treatment of Type 2 Diabetes Mellitus, referred by Jeannie Hinson MD, PCP. ASSESSMENT AND PLAN:    1. Type 2 diabetes mellitus with hyperglycemia, without long-term current use of insulin (HCC)  A1C is 8.7%. Glycemic control is suboptimal.  Patient reports he has not checked his blood sugars in over 1 year. Patient reports he does not want to take insulin injections. Discussed options at length with patient which include adding a DPP 4 inhibitor, or using a once weekly GLP-1 receptor agonist, or using daily basal insulin injection. --- Patient declines adding a DPP 4 inhibitor today. --- Patient declines using an injection today. --- Patient declines using a GLP-1 receptor agonist today. --- Patient declines attending diabetes education. --- Patient reports he will work on diet and exercise and will follow up with either primary care or endocrinology. --- Patient declined making a follow-up appointment today. Instructed patient to check blood sugars 4 times daily before meals and at bedtime. Instructed patient on the 15/15 rule for hypoglycemic management. Instructed patient to keep glucose tabs on hand at all times. Instructed patient to use glucagon for blood sugars less than 50 mg/dL. Instructed patient to contact the office if having consistent lows below 70 mg/dL or consistent highs greater than 250 mg/dL. Instructed patient to follow a carb controlled diet. Referred patient to diabetes education. Instructed patient on the importance of at least 20 minutes of daily physical activity such as walking. Instructed patient on the importance of diabetic foot wear and daily foot care.   Repeat labs prior to next visit. Follow-up in 1 months. Instructed on the dangers of high A1c greater than 7% associated with increased health complications including blindness, heart attack, stroke, end-stage renal disease, nerve damage to feet and hands, and increased risk for foot fractures and falls. Instructed on the risk for high cost of healthcare due to preventable organ damage.    - ONETOUCH VERIO strip; Check BG 3-4 times daily. E11.65  Dispense: 100 each; Refill: 3      Follow-up and Dispositions    · Return in about 3 months (around 9/1/2022). Tests or Results Reviewed: (see lab dates below in note) HgbA1C, Cr, GFR, Microalbumin/Cr ratio, Lipid Profile, TSH. History of Present Illness:    History given by patient. Works nights at [employer]. Date of DM diagnosis: age 42, year 11    Current Diabetes Medications: Farxiga 10 mg daily in the morning, Glimiperide 2 mg tablet    Medication Failures: metformin - stopped due to constipation and patient reports while on metformin - his PSA increased and his beard stopped growing. Once he stopped metformin patient said his PSA decreased and he could more easily urinate and his beard returned to grow again, Rybelsus caused diarrhea. Ozempic and Jardiance - tried for 2 weeks and did not improve BG. Patient said he tried synjardy and it did not lower BG    No history of DKA, DFW, pancreatitis, and gastroparesis.     Current symptoms: See Review of Systems    Nephropathy: Stage 2 Kidney Disease  04/14/2022 Cr 0.98, GFR 87, microalbumin/Cr ratio none    Hemoglobin A1c:  03/25/2020 11.3%  05/04/2021 8.9%  08/04/2021 8.5%  11/30/2021 7.5%  04/14/2022 8.7%    C-Peptide:  03/25/2020 1.7 (1.1-4.4)    Fasting Glucose:  03/31/2020 167    SYD-65:  03/25/2020 <5.0    Lipids:   Last  at goal of less than 100.  04/14/2022  TC- 152, LDL- 100, VLDL- 17,  HDL- 35, TG- 87    Thyroid:   04/14/2022 TSH 2.890    Other Labs:    Home blood glucose monitoring frequency: has not checked in over a year. Hypoglycemia: never    Neuropathy: none    Retinopathy: Last eye exam was 07/2021 and demonstrated no diabetic retinopathy. [facility] care specialist is [facility]. Cataract in Right eye. Doctor said he saw some damage in both eyes. Diet:   3 meals daily. Breakfast 6:30 AM bowl of cereal - wheat chex and milk scrambled eggs, linda or peanut butter toast jelly  Dinner: chicken nuggets, hamburger, spaghetti. Lunch: 1230 AM  [place] - ham turkey, potato chips, doritos. Water, coffee  ETOH: Beer - 4-5 drinks on Sunday, 1 Beer with dinner on Saturday. 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 Walks 3-4 miles daily. Diabetes education: The patient has not received formal diabetes education. BP:  BP Readings from Last 3 Encounters:   06/01/22 136/82   04/27/22 (!) 150/84   02/25/22 135/80        Weight Trends: Wt Readings from Last 3 Encounters:   06/01/22 212 lb 9.6 oz (96.4 kg)   04/27/22 214 lb (97.1 kg)   02/25/22 213 lb (96.6 kg)        Medical/Surgical/Social/Family History: Reviewed in Chart    Medications: Reviewed in chart    Allergies  Patient has no known allergies. Review of Systems   Constitutional: Positive for appetite change (hungry all the time) and fatigue. Negative for diaphoresis and unexpected weight change. HENT: Negative for trouble swallowing and voice change. Eyes: Negative for visual disturbance. Respiratory: Negative for cough, shortness of breath and wheezing. Cardiovascular: Negative for chest pain, palpitations and leg swelling. Gastrointestinal: Positive for constipation (constipated for 2-3 days then loose stool for 2-3 days, was taking creon - then stopped no improvement. now taking cinnamon.) and diarrhea (diarrhea stopped since taking Cinnamon). Negative for abdominal pain, nausea and vomiting. Endocrine: Positive for polyphagia and polyuria.  Negative for cold intolerance, heat intolerance and polydipsia. Genitourinary: Negative for difficulty urinating and frequency. Musculoskeletal: Positive for arthralgias (bone spurs in feet - history of broken feet as a child  ) and myalgias. Negative for back pain. Skin: Negative for pallor. Neurological: Negative for dizziness, tremors, weakness, numbness and headaches. Hematological: Negative for adenopathy. 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 Negative for dysphoric mood. The patient is not nervous/anxious. Irritable, \"cranky all the time\"       /82   Pulse 68   Temp 97.2 °F (36.2 °C) (Tympanic)   Ht 6' 2\" (1.88 m)   Wt 212 lb 9.6 oz (96.4 kg)   SpO2 100%   BMI 27.30 kg/m²     Physical Exam  Constitutional:       General: He is not in acute distress. Appearance: Normal appearance. He is normal weight. He is not ill-appearing. HENT:      Head: Normocephalic. Cardiovascular:      Rate and Rhythm: Normal rate and regular rhythm. Pulses: Normal pulses. Pulmonary:      Effort: No respiratory distress. Breath sounds: Normal breath sounds. No wheezing or rhonchi. Chest:      Chest wall: No tenderness. Abdominal:      General: There is no distension. Palpations: Abdomen is soft. Tenderness: There is no abdominal tenderness. There is no guarding. Musculoskeletal:         General: No swelling, tenderness or signs of injury. Cervical back: Neck supple. No tenderness. Right lower leg: No edema. Left lower leg: No edema. Feet:      Right foot:      Protective Sensation: 10 sites tested. 10 sites sensed. Skin integrity: Skin breakdown and dry skin present. No ulcer. Toenail Condition: Right toenails are normal.      Left foot:      Protective Sensation: 10 sites tested. 10 sites sensed. Skin integrity: Dry skin present. No ulcer.       Toenail Condition: Left toenails are normal.      Comments: Left foot pad - beneath toes, patient says he cuts his own calluses off his feet. Skin breakdown apparent. Lymphadenopathy:      Cervical: No cervical adenopathy. Skin:     General: Skin is warm and dry. Findings: No erythema or rash. Neurological:      Mental Status: He is alert. Motor: No weakness. Psychiatric:         Mood and Affect: Mood normal.         Behavior: Behavior normal.         No orders of the defined types were placed in this encounter. Current Outpatient Medications   Medication Sig Dispense Refill    FARXIGA 5 MG tablet Take 5 mg by mouth every morning      ONETOUCH VERIO strip Check BG 3-4 times daily. E11.65 100 each 3    vitamin D3 (CHOLECALCIFEROL) 25 MCG (1000 UT) TABS tablet Take 1,000 Units by mouth daily      vitamin B-12 (CYANOCOBALAMIN) 100 MCG tablet Take 50 mcg by mouth daily      vitamin B-6 (PYRIDOXINE) 100 MG tablet Take 100 mg by mouth daily      zinc 50 MG CAPS Take by mouth      Cinnamon 500 MG TABS Take by mouth      glimepiride (AMARYL) 2 MG tablet Take 2 mg by mouth daily       No current facility-administered medications for this visit. LEMUEL Escobar    On this date 6/1/2022 I have spent 70 minutes reviewing previous notes, test results and face to face with the patient discussing the diagnosis and importance of compliance with the treatment plan as well as documenting on the day of the visit. Portions of this note were generated with the assistance of voice recognition software. As such, some errors in transcription may be present.

## 2022-07-22 ENCOUNTER — HOSPITAL ENCOUNTER (OUTPATIENT)
Dept: CT IMAGING | Age: 62
Discharge: HOME | End: 2022-07-25
Payer: COMMERCIAL

## 2022-07-22 VITALS — WEIGHT: 220 LBS | HEIGHT: 74 IN | BODY MASS INDEX: 28.23 KG/M2

## 2022-07-22 DIAGNOSIS — F17.210 SMOKING GREATER THAN 40 PACK YEARS: ICD-10-CM

## 2022-07-22 PROCEDURE — 71271 CT THORAX LUNG CANCER SCR C-: CPT

## 2022-07-25 LAB
ALBUMIN SERPL-MCNC: 3.9 G/DL (ref 3.2–4.6)
ALBUMIN/GLOB SERPL: 1.2 {RATIO} (ref 1.2–3.5)
ALP SERPL-CCNC: 97 U/L (ref 50–136)
ALT SERPL-CCNC: 34 U/L (ref 12–65)
ANION GAP SERPL CALC-SCNC: 8 MMOL/L (ref 7–16)
AST SERPL-CCNC: 10 U/L (ref 15–37)
BILIRUB SERPL-MCNC: 0.9 MG/DL (ref 0.2–1.1)
BUN SERPL-MCNC: 10 MG/DL (ref 8–23)
CALCIUM SERPL-MCNC: 8.8 MG/DL (ref 8.3–10.4)
CHLORIDE SERPL-SCNC: 106 MMOL/L (ref 98–107)
CHOLEST SERPL-MCNC: 157 MG/DL
CO2 SERPL-SCNC: 26 MMOL/L (ref 21–32)
CREAT SERPL-MCNC: 1.1 MG/DL (ref 0.8–1.5)
CREAT UR-MCNC: 86 MG/DL
GLOBULIN SER CALC-MCNC: 3.2 G/DL (ref 2.3–3.5)
GLUCOSE SERPL-MCNC: 174 MG/DL (ref 65–100)
HDLC SERPL-MCNC: 38 MG/DL (ref 40–60)
HDLC SERPL: 4.1 {RATIO}
LDLC SERPL CALC-MCNC: 96.2 MG/DL
MICROALBUMIN UR-MCNC: 1.62 MG/DL
MICROALBUMIN/CREAT UR-RTO: 19 MG/G
POTASSIUM SERPL-SCNC: 3.9 MMOL/L (ref 3.5–5.1)
PROT SERPL-MCNC: 7.1 G/DL (ref 6.3–8.2)
SODIUM SERPL-SCNC: 140 MMOL/L (ref 136–145)
TRIGL SERPL-MCNC: 114 MG/DL (ref 35–150)
VLDLC SERPL CALC-MCNC: 22.8 MG/DL (ref 6–23)

## 2022-07-26 LAB
EST. AVERAGE GLUCOSE BLD GHB EST-MCNC: 209 MG/DL
HBA1C MFR BLD: 8.9 % (ref 4.8–5.6)

## 2022-08-03 ENCOUNTER — OFFICE VISIT (OUTPATIENT)
Dept: INTERNAL MEDICINE CLINIC | Facility: CLINIC | Age: 62
End: 2022-08-03
Payer: COMMERCIAL

## 2022-08-03 VITALS
BODY MASS INDEX: 27.35 KG/M2 | HEART RATE: 72 BPM | WEIGHT: 213 LBS | DIASTOLIC BLOOD PRESSURE: 72 MMHG | SYSTOLIC BLOOD PRESSURE: 142 MMHG | OXYGEN SATURATION: 98 %

## 2022-08-03 DIAGNOSIS — E11.65 TYPE 2 DIABETES MELLITUS WITH HYPERGLYCEMIA, WITHOUT LONG-TERM CURRENT USE OF INSULIN (HCC): ICD-10-CM

## 2022-08-03 DIAGNOSIS — E78.49 OTHER HYPERLIPIDEMIA: ICD-10-CM

## 2022-08-03 DIAGNOSIS — I10 ESSENTIAL HYPERTENSION: ICD-10-CM

## 2022-08-03 DIAGNOSIS — R97.20 ELEVATED PSA: Primary | ICD-10-CM

## 2022-08-03 PROCEDURE — 3052F HG A1C>EQUAL 8.0%<EQUAL 9.0%: CPT | Performed by: INTERNAL MEDICINE

## 2022-08-03 PROCEDURE — 99215 OFFICE O/P EST HI 40 MIN: CPT | Performed by: INTERNAL MEDICINE

## 2022-08-03 RX ORDER — DAPAGLIFLOZIN 10 MG/1
TABLET, FILM COATED ORAL
COMMUNITY
Start: 2022-08-02 | End: 2022-09-01 | Stop reason: SDUPTHER

## 2022-08-03 ASSESSMENT — PATIENT HEALTH QUESTIONNAIRE - PHQ9
1. LITTLE INTEREST OR PLEASURE IN DOING THINGS: 0
SUM OF ALL RESPONSES TO PHQ QUESTIONS 1-9: 0
SUM OF ALL RESPONSES TO PHQ QUESTIONS 1-9: 0
SUM OF ALL RESPONSES TO PHQ9 QUESTIONS 1 & 2: 0
SUM OF ALL RESPONSES TO PHQ QUESTIONS 1-9: 0
2. FEELING DOWN, DEPRESSED OR HOPELESS: 0
SUM OF ALL RESPONSES TO PHQ QUESTIONS 1-9: 0

## 2022-08-03 ASSESSMENT — ENCOUNTER SYMPTOMS
COUGH: 0
ABDOMINAL PAIN: 0
SHORTNESS OF BREATH: 0

## 2022-08-03 NOTE — PROGRESS NOTES
SUBJECTIVE:   James Gavin is a 60 y.o. male seen for a visit regarding   Chief Complaint   Patient presents with    Discuss Labs        HPI  Has a dog - [employer]([place])    H/o Depressed Mood - does not want medication; denies suicidal ideation  H/o Alcohol use - drinking less than 7 drinks a week now  Diabetes - diagnosed 10 years ago; saw Endocrinology once; glimipiride 2mg and Metformin - stopped it due to constipation; had diarrhea from Rybelsus per patient; HBA1c 8.9  HTN - no headache, does not want to take medications  Emphysema, Smoked cigarettes for 49 years - annual CT next due in 7/23 trying to quit has not a cigarette in a week, using nicotene lozenges  Thrombocytopenia - mild, Does not want HIV testing or see a Hematologist; says low platelets run in her family(mother)  Hyperlipidemia - LDL - ; does not want to take take cholesterol medication, understand risk of cardiovascular disease  Elevated PSA - this time is 4.7; seen urology  Skin cancer - seeing Dermatologist  Chronic diarrhea - Exocrine Pancreatic insufficiency; saw GI  Astigmatism - Sees Eye doctor  Patient says he had tdap and Pneumonia vaccine in the past 5 years; patient says she had shingles in 2021 Nov  Has not done colonoscopy, understands    Past Medical History, Past Surgical History, Family history, Social History, and Medications were all reviewed with the patient today and updated as necessary. Current Outpatient Medications   Medication Sig Dispense Refill    Semaglutide 14 MG TABS Take 1 tablet by mouth daily 30 tablet 5    ONETOUCH VERIO strip Check BG 3-4 times daily.  E11.65 100 each 3    vitamin D3 (CHOLECALCIFEROL) 25 MCG (1000 UT) TABS tablet Take 1,000 Units by mouth daily      vitamin B-12 (CYANOCOBALAMIN) 100 MCG tablet Take 50 mcg by mouth daily      vitamin B-6 (PYRIDOXINE) 100 MG tablet Take 100 mg by mouth daily      zinc 50 MG CAPS Take by mouth      Cinnamon 500 MG TABS Take by mouth glimepiride (AMARYL) 2 MG tablet Take 2 mg by mouth daily      FARXIGA 10 MG tablet        No current facility-administered medications for this visit. No Known Allergies  Patient Active Problem List   Diagnosis    Thrombocytopenia (HCC)    Excessive drinking alcohol    History of elevated PSA    Elevated PSA    Malignant melanoma of neck (HCC)    Hyperglycemia due to type 2 diabetes mellitus ([facility] [address]    Other hyperlipidemia     Past Medical History:   Diagnosis Date    Cancer ([facility]     melanoma    Diabetes ([facility]     ot currently on medications    Skin abnormalities      Past Surgical History:   Procedure Laterality Date    OTHER SURGICAL HISTORY      abcess rectal area     Family History   Adopted: Yes   Problem Relation Age of Onset    Heart Disease Father     Diabetes Sister     Cancer Maternal Aunt     Cancer Maternal Uncle      Social History     Tobacco Use    Smoking status: Every Day     Packs/day: 1.00     Years: 50.00     Pack years: 50.00     Types: Cigarettes     Last attempt to quit: 2020     Years since quittin.5    Smokeless tobacco: Never    Tobacco comments:     Quit smoking: trying to quit with patches   Substance Use Topics    Alcohol use: Yes     Alcohol/week: 12.0 standard drinks         Review of Systems   Constitutional:  Negative for fever. Respiratory:  Negative for cough and shortness of breath. Cardiovascular:  Negative for chest pain and leg swelling. Gastrointestinal:  Negative for abdominal pain. Psychiatric/Behavioral:  Negative for behavioral problems and confusion. OBJECTIVE:  BP (!) 142/72   Pulse 72   Wt 213 lb (96.6 kg)   SpO2 98%   BMI 27.35 kg/m²      Physical Exam  Vitals and nursing note reviewed. Constitutional:       General: He is not in acute distress. Cardiovascular:      Rate and Rhythm: Normal rate and regular rhythm. Pulmonary:      Effort: Pulmonary effort is normal.      Breath sounds: No wheezing.    Neurological:      General: No focal deficit present. Mental Status: He is oriented to person, place, and time. Psychiatric:         Mood and Affect: Mood normal.         Behavior: Behavior normal.       Medical problems and test results were reviewed with the patient today. Recent Results (from the past 672 hour(s))   Comprehensive Metabolic Panel    Collection Time: 07/25/22  3:03 PM   Result Value Ref Range    Sodium 140 136 - 145 mmol/L    Potassium 3.9 3.5 - 5.1 mmol/L    Chloride 106 98 - 107 mmol/L    CO2 26 21 - 32 mmol/L    Anion Gap 8 7 - 16 mmol/L    Glucose 174 (H) 65 - 100 mg/dL    BUN 10 8 - 23 MG/DL    Creatinine 1.10 0.8 - 1.5 MG/DL    GFR African American >60 >60 ml/min/1.73m2    GFR Non- >60 >60 ml/min/1.73m2    Calcium 8.8 8.3 - 10.4 MG/DL    Total Bilirubin 0.9 0.2 - 1.1 MG/DL    ALT 34 12 - 65 U/L    AST 10 (L) 15 - 37 U/L    Alk Phosphatase 97 50 - 136 U/L    Total Protein 7.1 6.3 - 8.2 g/dL    Albumin 3.9 3.2 - 4.6 g/dL    Globulin 3.2 2.3 - 3.5 g/dL    Albumin/Globulin Ratio 1.2 1.2 - 3.5     Lipid Panel    Collection Time: 07/25/22  3:03 PM   Result Value Ref Range    Cholesterol, Total 157 <200 MG/DL    Triglycerides 114 35 - 150 MG/DL    HDL 38 (L) 40 - 60 MG/DL    LDL Calculated 96.2 <100 MG/DL    VLDL Cholesterol Calculated 22.8 6.0 - 23.0 MG/DL    Chol/HDL Ratio 4.1     Microalbumin / Creatinine Urine Ratio    Collection Time: 07/25/22  3:03 PM   Result Value Ref Range    Microalbumin, Random Urine 1.62 <2.0 MG/DL    Creatinine, Ur 86.00 mg/dL    Microalbumin Creatinine Ratio 19 mg/g   Hemoglobin A1C    Collection Time: 07/25/22  3:03 PM   Result Value Ref Range    Hemoglobin A1C 8.9 (H) 4.8 - 5.6 %    eAG 209 mg/dL         ASSESSMENT and PLAN    Mireya Méndez was seen today for discuss labs. Diagnoses and all orders for this visit:    Elevated PSA  -     PSA Screening;  Future    Type 2 diabetes mellitus with hyperglycemia, without long-term current use of insulin (HCC)  -     Semaglutide 14 MG TABS; Take 1 tablet by mouth daily  -     Hemoglobin A1C; Future    Essential hypertension  -     Comprehensive Metabolic Panel; Future  -     CBC with Auto Differential; Future    Other hyperlipidemia  -     Lipid Panel; Future      Return in about 3 months (around 11/3/2022) for Diabetes(labs ~ 3 days before). It took more than 40 min to care for this pt today  Over 50% of today's office visit was spent in face to face time in counseling   (may include anyor all of the following: reviewing test results, prognosis, importance of compliance, education about disease process, benefits of medications, instructions for management of acute flare-ups, and follow up plans).

## 2022-09-01 RX ORDER — DAPAGLIFLOZIN 10 MG/1
10 TABLET, FILM COATED ORAL EVERY MORNING
Qty: 30 TABLET | Refills: 5 | Status: SHIPPED | OUTPATIENT
Start: 2022-09-01

## 2022-10-04 RX ORDER — DAPAGLIFLOZIN 5 MG/1
TABLET, FILM COATED ORAL
Qty: 30 TABLET | Refills: 29 | OUTPATIENT
Start: 2022-10-04

## 2022-11-13 DIAGNOSIS — E11.65 TYPE 2 DIABETES MELLITUS WITH HYPERGLYCEMIA (HCC): ICD-10-CM

## 2022-11-14 ENCOUNTER — TELEPHONE (OUTPATIENT)
Dept: INTERNAL MEDICINE CLINIC | Facility: CLINIC | Age: 62
End: 2022-11-14

## 2022-11-14 RX ORDER — GLIMEPIRIDE 2 MG/1
TABLET ORAL
Qty: 90 TABLET | Refills: 3 | OUTPATIENT
Start: 2022-11-14

## 2022-11-14 NOTE — TELEPHONE ENCOUNTER
----- Message from Lupe Indira sent at 11/14/2022  2:09 PM EST -----  Subject: Appointment Request    Reason for Call: Established Patient Appointment needed: Routine Existing   Condition Follow Up (Diabetes)    QUESTIONS    Reason for appointment request? Available appointments did not meet   patient need     Additional Information for Provider?  Pt is needing to r/s his appointment   on 11/16 due to him not getting his lab work done pt works night shift   would like to r/s at the end of November or in December pt would like to   have a first morning appointment he gets off around [place]   ---------------------------------------------------------------------------  --------------  Jeff YU  [phone]; OK to respond with electronic message via [facility] portal (only   for patients who have registered [facility] account)  ---------------------------------------------------------------------------  --------------  SCRIPT ANSWERS  COVID Screen: Tushar Gregg

## 2023-01-09 DIAGNOSIS — R97.20 ELEVATED PSA: ICD-10-CM

## 2023-01-09 DIAGNOSIS — I10 ESSENTIAL HYPERTENSION: ICD-10-CM

## 2023-01-09 DIAGNOSIS — E78.49 OTHER HYPERLIPIDEMIA: ICD-10-CM

## 2023-01-09 DIAGNOSIS — E11.65 TYPE 2 DIABETES MELLITUS WITH HYPERGLYCEMIA, WITHOUT LONG-TERM CURRENT USE OF INSULIN (HCC): ICD-10-CM

## 2023-01-09 LAB
ALBUMIN SERPL-MCNC: 3.7 G/DL (ref 3.2–4.6)
ALBUMIN/GLOB SERPL: 1.2 (ref 0.4–1.6)
ALP SERPL-CCNC: 174 U/L (ref 50–136)
ALT SERPL-CCNC: 62 U/L (ref 12–65)
ANION GAP SERPL CALC-SCNC: 6 MMOL/L (ref 2–11)
AST SERPL-CCNC: 30 U/L (ref 15–37)
BASOPHILS # BLD: 0 K/UL (ref 0–0.2)
BASOPHILS NFR BLD: 1 % (ref 0–2)
BILIRUB SERPL-MCNC: 0.9 MG/DL (ref 0.2–1.1)
BUN SERPL-MCNC: 11 MG/DL (ref 8–23)
CALCIUM SERPL-MCNC: 8.6 MG/DL (ref 8.3–10.4)
CHLORIDE SERPL-SCNC: 104 MMOL/L (ref 101–110)
CHOLEST SERPL-MCNC: 166 MG/DL
CO2 SERPL-SCNC: 28 MMOL/L (ref 21–32)
CREAT SERPL-MCNC: 1.1 MG/DL (ref 0.8–1.5)
DIFFERENTIAL METHOD BLD: ABNORMAL
EOSINOPHIL # BLD: 0 K/UL (ref 0–0.8)
EOSINOPHIL NFR BLD: 0 % (ref 0.5–7.8)
ERYTHROCYTE [DISTWIDTH] IN BLOOD BY AUTOMATED COUNT: 12.4 % (ref 11.9–14.6)
GLOBULIN SER CALC-MCNC: 3.2 G/DL (ref 2.8–4.5)
GLUCOSE SERPL-MCNC: 224 MG/DL (ref 65–100)
HCT VFR BLD AUTO: 46.1 % (ref 41.1–50.3)
HDLC SERPL-MCNC: 30 MG/DL (ref 40–60)
HDLC SERPL: 5.5
HGB BLD-MCNC: 15.8 G/DL (ref 13.6–17.2)
IMM GRANULOCYTES # BLD AUTO: 0 K/UL (ref 0–0.5)
IMM GRANULOCYTES NFR BLD AUTO: 1 % (ref 0–5)
LDLC SERPL CALC-MCNC: 101.6 MG/DL
LYMPHOCYTES # BLD: 1.6 K/UL (ref 0.5–4.6)
LYMPHOCYTES NFR BLD: 49 % (ref 13–44)
MCH RBC QN AUTO: 31.9 PG (ref 26.1–32.9)
MCHC RBC AUTO-ENTMCNC: 34.3 G/DL (ref 31.4–35)
MCV RBC AUTO: 93.1 FL (ref 82–102)
MONOCYTES # BLD: 0.6 K/UL (ref 0.1–1.3)
MONOCYTES NFR BLD: 18 % (ref 4–12)
NEUTS SEG # BLD: 1 K/UL (ref 1.7–8.2)
NEUTS SEG NFR BLD: 31 % (ref 43–78)
NRBC # BLD: 0 K/UL (ref 0–0.2)
PLATELET # BLD AUTO: 119 K/UL (ref 150–450)
PMV BLD AUTO: 10.2 FL (ref 9.4–12.3)
POTASSIUM SERPL-SCNC: 4.1 MMOL/L (ref 3.5–5.1)
PROT SERPL-MCNC: 6.9 G/DL (ref 6.3–8.2)
PSA SERPL-MCNC: 6.4 NG/ML
RBC # BLD AUTO: 4.95 M/UL (ref 4.23–5.6)
SODIUM SERPL-SCNC: 138 MMOL/L (ref 133–143)
TRIGL SERPL-MCNC: 172 MG/DL (ref 35–150)
VLDLC SERPL CALC-MCNC: 34.4 MG/DL (ref 6–23)
WBC # BLD AUTO: 3.2 K/UL (ref 4.3–11.1)

## 2023-01-10 ENCOUNTER — OFFICE VISIT (OUTPATIENT)
Dept: INTERNAL MEDICINE CLINIC | Facility: CLINIC | Age: 63
End: 2023-01-10
Payer: COMMERCIAL

## 2023-01-10 VITALS
DIASTOLIC BLOOD PRESSURE: 78 MMHG | WEIGHT: 219.8 LBS | HEART RATE: 67 BPM | OXYGEN SATURATION: 96 % | SYSTOLIC BLOOD PRESSURE: 132 MMHG | BODY MASS INDEX: 28.22 KG/M2

## 2023-01-10 DIAGNOSIS — E11.65 TYPE 2 DIABETES MELLITUS WITH HYPERGLYCEMIA, WITHOUT LONG-TERM CURRENT USE OF INSULIN (HCC): ICD-10-CM

## 2023-01-10 DIAGNOSIS — I10 ESSENTIAL HYPERTENSION: Primary | ICD-10-CM

## 2023-01-10 LAB
EST. AVERAGE GLUCOSE BLD GHB EST-MCNC: 266 MG/DL
HBA1C MFR BLD: 10.9 % (ref 4.8–5.6)

## 2023-01-10 PROCEDURE — 3075F SYST BP GE 130 - 139MM HG: CPT | Performed by: INTERNAL MEDICINE

## 2023-01-10 PROCEDURE — 99215 OFFICE O/P EST HI 40 MIN: CPT | Performed by: INTERNAL MEDICINE

## 2023-01-10 PROCEDURE — 3078F DIAST BP <80 MM HG: CPT | Performed by: INTERNAL MEDICINE

## 2023-01-10 RX ORDER — DAPAGLIFLOZIN 10 MG/1
10 TABLET, FILM COATED ORAL EVERY MORNING
Qty: 30 TABLET | Refills: 5 | Status: SHIPPED | OUTPATIENT
Start: 2023-01-10

## 2023-01-10 RX ORDER — GLIMEPIRIDE 2 MG/1
2 TABLET ORAL DAILY
Qty: 90 TABLET | Refills: 3 | Status: SHIPPED | OUTPATIENT
Start: 2023-01-10

## 2023-01-10 RX ORDER — BLOOD-GLUCOSE SENSOR
EACH MISCELLANEOUS
Qty: 2 EACH | Refills: 12 | Status: SHIPPED | OUTPATIENT
Start: 2023-01-10

## 2023-01-10 ASSESSMENT — PATIENT HEALTH QUESTIONNAIRE - PHQ9
2. FEELING DOWN, DEPRESSED OR HOPELESS: 0
SUM OF ALL RESPONSES TO PHQ QUESTIONS 1-9: 0
SUM OF ALL RESPONSES TO PHQ QUESTIONS 1-9: 0
1. LITTLE INTEREST OR PLEASURE IN DOING THINGS: 0
SUM OF ALL RESPONSES TO PHQ9 QUESTIONS 1 & 2: 0
SUM OF ALL RESPONSES TO PHQ QUESTIONS 1-9: 0
SUM OF ALL RESPONSES TO PHQ QUESTIONS 1-9: 0

## 2023-01-10 ASSESSMENT — ENCOUNTER SYMPTOMS
COUGH: 0
ABDOMINAL PAIN: 0
SHORTNESS OF BREATH: 0

## 2023-01-10 NOTE — PROGRESS NOTES
SUBJECTIVE:   Manolo Armendariz is a 60 y.o. male seen for a visit regarding   Chief Complaint   Patient presents with    Follow-up Chronic Condition     3 month follow with labs        HPI  Has a dog - [employer]([place])     H/o Depressed Mood - does not want medication; denies suicidal ideation  H/o Alcohol use - drinking less than 7 drinks a week now  Diabetes - diagnosed 10 years ago; saw Endocrinology once; glimipiride 2mg; Metformin - stopped it due to constipation; had diarrhea from [place] per patient; HBA1c 8.9; patient is non complaint; willing to try CGM  HTN - no headache, does not want to take medications  Emphysema, Smoked cigarettes for 49 years - annual CT next due in 7/23 trying to quit has not a cigarette in a week, using nicotene lozenges  Leucopenia, Thrombocytopenia - mild, Does not want HIV testing or see a Hematologist; says low platelets run in her family(mother)  Hyperlipidemia - does not want to take take cholesterol medication, understand risk of cardiovascular disease  Elevated PSA - seen urology  Skin cancer - seeing Dermatologist  Chronic diarrhea - Exocrine Pancreatic insufficiency; saw GI  Astigmatism - Sees Eye doctor  Patient says he had tdap and Pneumonia vaccine in the past 5 years; patient says she had shingles in 2021 Nov  Has not done colonoscopy, understands risk of missing colon cancer    Past Medical History, Past Surgical History, Family history, Social History, and Medications were all reviewed with the patient today and updated as necessary. Current Outpatient Medications   Medication Sig Dispense Refill    Semaglutide 14 MG TABS Take 1 tablet by mouth daily 30 tablet 5    Continuous Blood Gluc Sensor (FREESTYLE SUMAYA 3 SENSOR) MISC Diabetes type 2 2 each 12    FARXIGA 10 MG tablet Take 1 tablet by mouth every morning 30 tablet 5    glimepiride (AMARYL) 2 MG tablet Take 1 tablet by mouth daily 90 tablet 3    ONETOUCH VERIO strip Check BG 3-4 times daily. E11.65 100 each 3    vitamin D3 (CHOLECALCIFEROL) 25 MCG (1000 UT) TABS tablet Take 1,000 Units by mouth daily      vitamin B-12 (CYANOCOBALAMIN) 100 MCG tablet Take 50 mcg by mouth daily      vitamin B-6 (PYRIDOXINE) 100 MG tablet Take 100 mg by mouth daily      zinc 50 MG CAPS Take by mouth      Cinnamon 500 MG TABS Take by mouth       No current facility-administered medications for this visit. No Known Allergies  Patient Active Problem List   Diagnosis    Thrombocytopenia (HCC)    Excessive drinking alcohol    History of elevated PSA    Elevated PSA    Malignant melanoma of neck (HCC)    Hyperglycemia due to type 2 diabetes mellitus ([facility] [address]    Other hyperlipidemia     Past Medical History:   Diagnosis Date    Cancer ([facility]     melanoma    Diabetes ([facility]     ot currently on medications    Skin abnormalities      Past Surgical History:   Procedure Laterality Date    OTHER SURGICAL HISTORY      abcess rectal area     Family History   Adopted: Yes   Problem Relation Age of Onset    Heart Disease Father     Diabetes Sister     Cancer Maternal Aunt     Cancer Maternal Uncle      Social History     Tobacco Use    Smoking status: Every Day     Packs/day: 1.00     Years: 50.00     Pack years: 50.00     Types: Cigarettes     Last attempt to quit: 1/1/2020     Years since quitting: 3.0    Smokeless tobacco: Never    Tobacco comments:     Quit smoking: trying to quit with patches   Substance Use Topics    Alcohol use: Yes     Alcohol/week: 12.0 standard drinks         Review of Systems   Constitutional:  Negative for fever. Respiratory:  Negative for cough and shortness of breath. Cardiovascular:  Negative for chest pain and leg swelling. Gastrointestinal:  Negative for abdominal pain. Psychiatric/Behavioral:  Negative for behavioral problems and confusion.         OBJECTIVE:  /78 (Site: Left Upper Arm, Position: Sitting, Cuff Size: Small Adult)   Pulse 67   Wt 219 lb 12.8 oz (99.7 kg)   SpO2 96%   BMI 28.22 kg/m²      Physical Exam  Vitals and nursing note reviewed. Constitutional:       General: He is not in acute distress. Cardiovascular:      Rate and Rhythm: Normal rate and regular rhythm. Pulmonary:      Effort: Pulmonary effort is normal.      Breath sounds: No wheezing. Neurological:      General: No focal deficit present. Mental Status: He is oriented to person, place, and time. Psychiatric:         Mood and Affect: Mood normal.         Behavior: Behavior normal.       Medical problems and test results were reviewed with the patient today.      Recent Results (from the past 672 hour(s))   Lipid Panel    Collection Time: 01/09/23  8:23 AM   Result Value Ref Range    Cholesterol, Total 166 <200 MG/DL    Triglycerides 172 (H) 35 - 150 MG/DL    HDL 30 (L) 40 - 60 MG/DL    LDL Calculated 101.6 (H) <100 MG/DL    VLDL Cholesterol Calculated 34.4 (H) 6.0 - 23.0 MG/DL    Chol/HDL Ratio 5.5     CBC with Auto Differential    Collection Time: 01/09/23  8:23 AM   Result Value Ref Range    WBC 3.2 (L) 4.3 - 11.1 K/uL    RBC 4.95 4.23 - 5.6 M/uL    Hemoglobin 15.8 13.6 - 17.2 g/dL    Hematocrit 46.1 41.1 - 50.3 %    MCV 93.1 82 - 102 FL    MCH 31.9 26.1 - 32.9 PG    MCHC 34.3 31.4 - 35.0 g/dL    RDW 12.4 11.9 - 14.6 %    Platelets 707 (L) 798 - 450 K/uL    MPV 10.2 9.4 - 12.3 FL    nRBC 0.00 0.0 - 0.2 K/uL    Differential Type AUTOMATED      Seg Neutrophils 31 (L) 43 - 78 %    Lymphocytes 49 (H) 13 - 44 %    Monocytes 18 (H) 4.0 - 12.0 %    Eosinophils % 0 (L) 0.5 - 7.8 %    Basophils 1 0.0 - 2.0 %    Immature Granulocytes 1 0.0 - 5.0 %    Segs Absolute 1.0 (L) 1.7 - 8.2 K/UL    Absolute Lymph # 1.6 0.5 - 4.6 K/UL    Absolute Mono # 0.6 0.1 - 1.3 K/UL    Absolute Eos # 0.0 0.0 - 0.8 K/UL    Basophils Absolute 0.0 0.0 - 0.2 K/UL    Absolute Immature Granulocyte 0.0 0.0 - 0.5 K/UL   Comprehensive Metabolic Panel    Collection Time: 01/09/23  8:23 AM   Result Value Ref Range    Sodium 138 133 - 143 mmol/L Potassium 4.1 3.5 - 5.1 mmol/L    Chloride 104 101 - 110 mmol/L    CO2 28 21 - 32 mmol/L    Anion Gap 6 2 - 11 mmol/L    Glucose 224 (H) 65 - 100 mg/dL    BUN 11 8 - 23 MG/DL    Creatinine 1.10 0.8 - 1.5 MG/DL    Est, Glom Filt Rate >60 >60 ml/min/1.73m2    Calcium 8.6 8.3 - 10.4 MG/DL    Total Bilirubin 0.9 0.2 - 1.1 MG/DL    ALT 62 12 - 65 U/L    AST 30 15 - 37 U/L    Alk Phosphatase 174 (H) 50 - 136 U/L    Total Protein 6.9 6.3 - 8.2 g/dL    Albumin 3.7 3.2 - 4.6 g/dL    Globulin 3.2 2.8 - 4.5 g/dL    Albumin/Globulin Ratio 1.2 0.4 - 1.6     PSA Screening    Collection Time: 01/09/23  8:23 AM   Result Value Ref Range    PSA 6.4 (H) <4.0 ng/mL         ASSESSMENT and PLAN    Alivia Epstein was seen today for follow-up chronic condition. Diagnoses and all orders for this visit:    Essential hypertension  -     CBC with Auto Differential; Future  -     Comprehensive Metabolic Panel; Future  -     Lipid Panel; Future  -     TSH; Future    Type 2 diabetes mellitus with hyperglycemia, without long-term current use of insulin (HCC)  -     Semaglutide 14 MG TABS; Take 1 tablet by mouth daily  -     Continuous Blood Gluc Sensor (FREESTYLE SUMAYA 3 SENSOR) MISC; Diabetes type 2  -     FARXIGA 10 MG tablet; Take 1 tablet by mouth every morning  -     glimepiride (AMARYL) 2 MG tablet; Take 1 tablet by mouth daily  -     Hemoglobin A1C; Future  -     Microalbumin / Creatinine Urine Ratio; Future        Return in about 14 weeks (around 4/18/2023). It took more than 40 min to care for this pt today  Over 50% of today's office visit was spent in face to face time in counseling   (may include anyor all of the following: reviewing test results, prognosis, importance of compliance, education about disease process, benefits of medications, instructions for management of acute flare-ups, and follow up plans).

## 2023-01-11 NOTE — RESULT ENCOUNTER NOTE
Hba1c is 10.9 - this is very high. Advise patient to make an appt. with [facility].  Patient has last seen them on 6/1/2022

## 2023-01-12 ENCOUNTER — TELEPHONE (OUTPATIENT)
Dept: INTERNAL MEDICINE CLINIC | Facility: CLINIC | Age: 63
End: 2023-01-12

## 2023-01-12 NOTE — TELEPHONE ENCOUNTER
----- Message from Randy Cintron MD sent at 1/12/2023 10:03 AM EST -----  Regarding: RE: Freestyle Shilpa 2 and 3 systems  Please advise patient to see endocrinology for Diabetes care as he likely needs insulin. He cannot afford to not take care of his Diabetes. ----- Message -----  From: Alex Lloyd MA  Sent: 1/11/2023   4:18 PM EST  To: Randy Cintron MD  Subject: [place]: Freestyle Shilpa 2 and 3 systems              Could you please review message below and advise  Thank you  ----- Message -----  From: Ruth Ivey. Sent: 1/10/2023  11:43 AM EST  To: , #  Subject: Freestyle Shilpa 2 and 3 systems                  My phone is not compatible with the [employer]. I cannot afford to upgrade my phone at this time.

## 2023-05-18 DIAGNOSIS — E11.65 TYPE 2 DIABETES MELLITUS WITH HYPERGLYCEMIA, WITHOUT LONG-TERM CURRENT USE OF INSULIN (HCC): ICD-10-CM

## 2023-05-18 RX ORDER — GLIMEPIRIDE 2 MG/1
2 TABLET ORAL DAILY
Qty: 30 TABLET | Refills: 1 | Status: SHIPPED | OUTPATIENT
Start: 2023-05-18

## 2023-06-30 ENCOUNTER — OFFICE VISIT (OUTPATIENT)
Dept: ENDOCRINOLOGY | Age: 63
End: 2023-06-30

## 2023-06-30 VITALS
DIASTOLIC BLOOD PRESSURE: 80 MMHG | SYSTOLIC BLOOD PRESSURE: 125 MMHG | WEIGHT: 215 LBS | BODY MASS INDEX: 27.6 KG/M2 | HEART RATE: 68 BPM | OXYGEN SATURATION: 99 %

## 2023-06-30 DIAGNOSIS — E11.65 TYPE 2 DIABETES MELLITUS WITH HYPERGLYCEMIA, WITHOUT LONG-TERM CURRENT USE OF INSULIN (HCC): ICD-10-CM

## 2023-06-30 DIAGNOSIS — E11.69 TYPE 2 DIABETES MELLITUS WITH HYPERCHOLESTEROLEMIA (HCC): Chronic | ICD-10-CM

## 2023-06-30 DIAGNOSIS — S90.521A INFECTED BLISTER OF RIGHT ANKLE, INITIAL ENCOUNTER: ICD-10-CM

## 2023-06-30 DIAGNOSIS — L08.9 INFECTED BLISTER OF RIGHT ANKLE, INITIAL ENCOUNTER: ICD-10-CM

## 2023-06-30 DIAGNOSIS — E78.00 TYPE 2 DIABETES MELLITUS WITH HYPERCHOLESTEROLEMIA (HCC): Chronic | ICD-10-CM

## 2023-06-30 DIAGNOSIS — E11.65 TYPE 2 DIABETES MELLITUS WITH HYPERGLYCEMIA, WITHOUT LONG-TERM CURRENT USE OF INSULIN (HCC): Primary | ICD-10-CM

## 2023-06-30 LAB
ALBUMIN SERPL-MCNC: 3.7 G/DL (ref 3.2–4.6)
ALBUMIN/GLOB SERPL: 1.1 (ref 0.4–1.6)
ALP SERPL-CCNC: 130 U/L (ref 50–136)
ALT SERPL-CCNC: 33 U/L (ref 12–65)
ANION GAP SERPL CALC-SCNC: 5 MMOL/L (ref 2–11)
AST SERPL-CCNC: 11 U/L (ref 15–37)
BILIRUB SERPL-MCNC: 0.5 MG/DL (ref 0.2–1.1)
BUN SERPL-MCNC: 15 MG/DL (ref 8–23)
CALCIUM SERPL-MCNC: 9 MG/DL (ref 8.3–10.4)
CHLORIDE SERPL-SCNC: 106 MMOL/L (ref 101–110)
CO2 SERPL-SCNC: 26 MMOL/L (ref 21–32)
CREAT SERPL-MCNC: 0.9 MG/DL (ref 0.8–1.5)
CREAT UR-MCNC: 45 MG/DL
GLOBULIN SER CALC-MCNC: 3.5 G/DL (ref 2.8–4.5)
GLUCOSE SERPL-MCNC: 187 MG/DL (ref 65–100)
HBA1C MFR BLD: 8.9 %
MICROALBUMIN UR-MCNC: 1.14 MG/DL
MICROALBUMIN/CREAT UR-RTO: 25 MG/G (ref 0–30)
POTASSIUM SERPL-SCNC: 3.8 MMOL/L (ref 3.5–5.1)
PROT SERPL-MCNC: 7.2 G/DL (ref 6.3–8.2)
SODIUM SERPL-SCNC: 137 MMOL/L (ref 133–143)

## 2023-06-30 RX ORDER — ORAL SEMAGLUTIDE 14 MG/1
1 TABLET ORAL DAILY
Qty: 90 TABLET | Refills: 3 | Status: SHIPPED | OUTPATIENT
Start: 2023-06-30

## 2023-06-30 RX ORDER — GLIMEPIRIDE 2 MG/1
2 TABLET ORAL DAILY
Qty: 90 TABLET | Refills: 3 | Status: SHIPPED | OUTPATIENT
Start: 2023-06-30

## 2023-06-30 RX ORDER — DAPAGLIFLOZIN 10 MG/1
10 TABLET, FILM COATED ORAL EVERY MORNING
Qty: 90 TABLET | Refills: 3 | Status: SHIPPED | OUTPATIENT
Start: 2023-06-30

## 2023-06-30 ASSESSMENT — ENCOUNTER SYMPTOMS
DIARRHEA: 0
CONSTIPATION: 0

## 2023-07-01 LAB — C PEPTIDE SERPL-MCNC: 2.1 NG/ML (ref 1.1–4.4)

## (undated) DEVICE — GARMENT,MEDLINE,DVT,INT,CALF,MED, GEN2: Brand: MEDLINE

## (undated) DEVICE — 3M™ TEGADERM™ TRANSPARENT FILM DRESSING FRAME STYLE, 1626W, 4 IN X 4-3/4 IN (10 CM X 12 CM), 50/CT 4CT/CASE: Brand: 3M™ TEGADERM™

## (undated) DEVICE — PAD,NON-ADHERENT,3X8,STERILE,LF,1/PK: Brand: MEDLINE

## (undated) DEVICE — SUTURE ETHLN SZ 2-0 L18IN NONABSORBABLE BLK L26MM PS 3/8 585H

## (undated) DEVICE — SUTURE VCRL SZ 3-0 L18IN ABSRB UD L26MM SH 1/2 CIR J864D

## (undated) DEVICE — UNIVERSAL DRAPES: Brand: MEDLINE INDUSTRIES, INC.

## (undated) DEVICE — APPLIER CLP L9.38IN M LIG TI DISP STR RNG HNDL LIGACLP

## (undated) DEVICE — Device

## (undated) DEVICE — SOLUTION IV 1000ML 0.9% SOD CHL

## (undated) DEVICE — BUTTON SWITCH PENCIL BLADE ELECTRODE, HOLSTER: Brand: EDGE

## (undated) DEVICE — TRAY PREP DRY W/ PREM GLV 2 APPL 6 SPNG 2 UNDPD 1 OVERWRAP

## (undated) DEVICE — AMD ANTIMICROBIAL GAUZE SPONGES,12 PLY USP TYPE VII, 0.2% POLYHEXAMETHYLENE BIGUANIDE HCI (PHMB): Brand: CURITY

## (undated) DEVICE — COVER US PRB W12XL244CM FLD IORT STR TIP

## (undated) DEVICE — REM POLYHESIVE ADULT PATIENT RETURN ELECTRODE: Brand: VALLEYLAB

## (undated) DEVICE — SYRINGE MED 3ML NDL 21GA L1.5IN LUERLOCK TIP REG BVL SFTY N

## (undated) DEVICE — GAUZE,SPONGE,4"X4",16PLY,STRL,LF,10/TRAY: Brand: MEDLINE

## (undated) DEVICE — STANDARD HYPODERMIC NEEDLE,POLYPROPYLENE HUB: Brand: MONOJECT

## (undated) DEVICE — DRAPE,TOP,102X53,STERILE: Brand: MEDLINE

## (undated) DEVICE — MINOR SPLIT GENERAL: Brand: MEDLINE INDUSTRIES, INC.

## (undated) DEVICE — BLADE SURG NO15 S STL STR DISP GLASSVAN

## (undated) DEVICE — 2000CC GUARDIAN II: Brand: GUARDIAN

## (undated) DEVICE — DRAPE TWL SURG 16X26IN BLU ORB04] ALLCARE INC]